# Patient Record
Sex: MALE | Race: WHITE | NOT HISPANIC OR LATINO | Employment: FULL TIME | RURAL
[De-identification: names, ages, dates, MRNs, and addresses within clinical notes are randomized per-mention and may not be internally consistent; named-entity substitution may affect disease eponyms.]

---

## 2022-02-28 ENCOUNTER — OFFICE VISIT (OUTPATIENT)
Dept: CARDIOLOGY | Facility: CLINIC | Age: 57
End: 2022-02-28

## 2022-02-28 VITALS
HEIGHT: 69 IN | SYSTOLIC BLOOD PRESSURE: 125 MMHG | WEIGHT: 168 LBS | BODY MASS INDEX: 24.88 KG/M2 | HEART RATE: 121 BPM | DIASTOLIC BLOOD PRESSURE: 82 MMHG

## 2022-02-28 DIAGNOSIS — R06.02 SHORTNESS OF BREATH: ICD-10-CM

## 2022-02-28 DIAGNOSIS — R00.2 PALPITATIONS: Primary | ICD-10-CM

## 2022-02-28 PROCEDURE — 99204 OFFICE O/P NEW MOD 45 MIN: CPT | Performed by: SPECIALIST

## 2022-02-28 PROCEDURE — 93000 ELECTROCARDIOGRAM COMPLETE: CPT | Performed by: SPECIALIST

## 2022-02-28 RX ORDER — METOPROLOL SUCCINATE 25 MG/1
25 TABLET, EXTENDED RELEASE ORAL DAILY
Qty: 90 TABLET | Refills: 3 | Status: SHIPPED | OUTPATIENT
Start: 2022-02-28 | End: 2022-06-06 | Stop reason: SDUPTHER

## 2022-02-28 RX ORDER — OMEPRAZOLE 40 MG/1
CAPSULE, DELAYED RELEASE ORAL
COMMUNITY
Start: 2022-02-08

## 2022-02-28 RX ORDER — ASPIRIN 81 MG/1
81 TABLET ORAL DAILY
COMMUNITY

## 2022-02-28 RX ORDER — CETIRIZINE HYDROCHLORIDE 10 MG/1
10 TABLET ORAL DAILY
COMMUNITY
Start: 2021-12-16

## 2022-02-28 NOTE — PROGRESS NOTES
[facility]   Cardiology Consult Note    Patient Name: Melvin Lew  : 1965  Referring Physician: No ref. provider found  Subjective   Subjective     Reason for Consult/ Chief Complaint:   Chief Complaint   Patient presents with   • Palpitations   • Shortness of Breath   • Dizziness       HPI:  Melvin Lew is a 56 y.o. male with history of shortness of breath for the last 2 years or so.  Shortness of breath is mostly on exertion relieved by rest.  He also has had palpitations on and off for the last year or so.  No syncopal or presyncopal episode.  Recent Holter showed frequent PVCs and bigeminal rhythm.  No chest pain.  He has chronic dizziness.    Review of Systems:   Constitutional no fever,  no weight loss   Skin no rash   Otolaryngeal no difficulty swallowing   Cardiovascular See HPI   Pulmonary no cough, no sputum production   Gastrointestinal no constipation, no diarrhea   Genitourinary no dysuria, no hematuria   Hematologic no easy bruisability, no abnormal bleeding   Musculoskeletal no muscle pain   Neurologic no dizziness, no falls     Personal History     Past Medical History:  Past Medical History:   Diagnosis Date   • Abnormal ECG    • Cancer (HCC)        Family History:   Family History   Problem Relation Age of Onset   • Stroke Father        Social History:  reports that he has quit smoking. He has never used smokeless tobacco. He reports that he does not drink alcohol and does not use drugs.    Home Medications:  aspirin, cetirizine, omeprazole, and sertraline    Allergies:  No Known Allergies    Objective    Objective     Vitals:   Heart Rate:  [121] 121  BP: (125)/(82) 125/82  Body mass index is 24.81 kg/m².  Physical Exam:   Constitutional: Awake, alert, No acute distress    Eyes: PERRLA, sclerae anicteric, no conjunctival injection   HENT: NCAT, mucous membranes moist   Neck: Supple, no thyromegaly, no lymphadenopathy, trachea midline   Respiratory: Clear to auscultation  bilaterally, nonlabored respirations    Cardiovascular: RRR, no murmurs or rubs. Palpable pedal pulses bilaterally   Gastrointestinal: Positive bowel sounds, soft, nontender, nondistended   Musculoskeletal: No bilateral ankle edema, no clubbing or cyanosis to extremities   Psychiatric: Appropriate affect, cooperative   Neurologic: Oriented x 3, strength symmetric in all extremities, Cranial Nerves grossly intact to confrontation, speech clear   Skin: No rashes     Result Review    Result Review:  I have personally reviewed the available results:  [x]  Laboratory  [x]  EKG/Telemetry   [x]  Cardiology/Vascular   [x] Medications  [x]  Old records             ECG 12 Lead    Date/Time: 2/28/2022 12:01 PM  Performed by: Hay Mixon MD  Authorized by: Hay Mixon MD   Comparison: not compared with previous ECG   Previous ECG: no previous ECG available  Rhythm: sinus rhythm  Rate: normal  QRS axis: normal    Clinical impression: normal ECG  Comments: Normal sinus rhythm.  No significant acute changes noted.             Impression/Plan  1.  Palpitations/PVCs: 24-hour Holter reviewed shows frequent PVCs and bigeminal rhythm.  Start Toprol-XL 25 mg a day.  Low caffeine diet advised.  Echocardiogram to evaluate left ventricular systolic function.  Treadmill stress test to evaluate for ischemia.  Check magnesium levels.        Electronically signed by Hay Mixon MD, 02/28/22, 11:30 AM EST.

## 2022-03-11 ENCOUNTER — TELEPHONE (OUTPATIENT)
Dept: CARDIOLOGY | Facility: CLINIC | Age: 57
End: 2022-03-11

## 2022-03-11 NOTE — TELEPHONE ENCOUNTER
----- Message from Hay Mixon MD sent at 3/7/2022 12:21 PM EST -----  Notify pt echocardiogram shows normal heart function and no significant valve abnormality. Keep follow up as scheduled.

## 2022-03-25 ENCOUNTER — TELEPHONE (OUTPATIENT)
Dept: CARDIOLOGY | Facility: CLINIC | Age: 57
End: 2022-03-25

## 2022-03-25 NOTE — TELEPHONE ENCOUNTER
----- Message from Hay Mixon MD sent at 3/21/2022  2:27 PM EDT -----  Notify pt stress test is negative. Keep follow up as scheduled.

## 2022-06-05 NOTE — PROGRESS NOTES
[facility]  Cardiology progress Note    Patient Name: Melvin Lew  : 1965    CHIEF COMPLAINT  Palpitations      Subjective   Subjective     HISTORY OF PRESENT ILLNESS    Melvin Lew is a 56 y.o. male with history of palpitations and PVCs.  No palpitations.  No chest pain.    Review of Systems:   Constitutional no fever,  no weight loss   Skin no rash   Otolaryngeal no difficulty swallowing   Cardiovascular See HPI   Pulmonary no cough, no sputum production   Gastrointestinal no constipation, no diarrhea   Genitourinary no dysuria, no hematuria   Hematologic no easy bruisability, no abnormal bleeding   Musculoskeletal no muscle pain   Neurologic no dizziness, no falls         Personal History     Social History:  reports that he has quit smoking. He has never used smokeless tobacco. He reports that he does not drink alcohol and does not use drugs.    Home Medications:  Current Outpatient Medications on File Prior to Visit   Medication Sig   • aspirin 81 MG EC tablet Take 81 mg by mouth Daily.   • cetirizine (zyrTEC) 10 MG tablet Take 10 mg by mouth Daily. FOR 30 DAYS   • omeprazole (priLOSEC) 40 MG capsule TAKE 1 CAPSULE BY MOUTH ONCE DAILY 30 MINUTES BEFORE MORNING MEAL   • sertraline (ZOLOFT) 50 MG tablet Take 50 mg by mouth Daily.   • [DISCONTINUED] metoprolol succinate XL (Toprol XL) 25 MG 24 hr tablet Take 1 tablet by mouth Daily.     No current facility-administered medications on file prior to visit.     Allergies:  No Known Allergies    Objective    Objective       Vitals:   Heart Rate:  [66] 66  BP: (122)/(86) 122/86  Body mass index is 24.66 kg/m².     Physical Exam:   Constitutional: Awake, alert, No acute distress    Eyes: PERRLA, sclerae anicteric, no conjunctival injection   HENT: NCAT, mucous membranes moist   Neck: Supple, no thyromegaly, no lymphadenopathy, trachea midline   Respiratory: Clear to auscultation bilaterally, nonlabored respirations    Cardiovascular: RRR,  no murmurs or rubs. Palpable pedal pulses bilaterally   Musculoskeletal: No bilateral ankle edema, no cyanosis to extremities   Psychiatric: Appropriate affect, cooperative   Neurologic: Oriented x 3, strength symmetric in all extremities, Cranial Nerves grossly intact to confrontation, speech clear   Skin: No rashes.    Result Review    Result Review:  I have personally reviewed the available results from  [x]  Laboratory  [x]  EKG  [x]  Cardiology  [x]  Medications  [x]  Old records  []  Other:   Procedures  Results for orders placed in visit on 03/07/22    Adult Transthoracic Echo Complete W/ Cont if Necessary Per Protocol    Interpretation Summary  Normal left ventricular systolic function.  No significant valve abnormalities noted.     Impression/Plan:  1.  Palpitations/PVCs: Continue Toprol-XL 25 mg a day.  Echocardiogram showed normal systolic function.  Treadmill stress test was negative for ischemia.           Hay Mixon MD   06/06/22   10:56 EDT

## 2022-06-06 ENCOUNTER — OFFICE VISIT (OUTPATIENT)
Dept: CARDIOLOGY | Facility: CLINIC | Age: 57
End: 2022-06-06

## 2022-06-06 VITALS
HEIGHT: 69 IN | SYSTOLIC BLOOD PRESSURE: 122 MMHG | WEIGHT: 167 LBS | HEART RATE: 66 BPM | BODY MASS INDEX: 24.73 KG/M2 | DIASTOLIC BLOOD PRESSURE: 86 MMHG

## 2022-06-06 DIAGNOSIS — R00.2 PALPITATIONS: Primary | ICD-10-CM

## 2022-06-06 PROCEDURE — 99213 OFFICE O/P EST LOW 20 MIN: CPT | Performed by: SPECIALIST

## 2022-06-06 RX ORDER — METOPROLOL SUCCINATE 25 MG/1
25 TABLET, EXTENDED RELEASE ORAL DAILY
Qty: 90 TABLET | Refills: 3 | Status: SHIPPED | OUTPATIENT
Start: 2022-06-06

## 2022-06-06 RX ORDER — METOPROLOL SUCCINATE 25 MG/1
25 TABLET, EXTENDED RELEASE ORAL DAILY
Qty: 90 TABLET | Refills: 3 | Status: SHIPPED | OUTPATIENT
Start: 2022-06-06 | End: 2022-06-06 | Stop reason: SDUPTHER

## 2023-01-03 ENCOUNTER — OFFICE VISIT (OUTPATIENT)
Dept: CARDIOLOGY | Facility: CLINIC | Age: 58
End: 2023-01-03
Payer: COMMERCIAL

## 2023-01-03 VITALS
BODY MASS INDEX: 24.29 KG/M2 | HEIGHT: 69 IN | WEIGHT: 164 LBS | HEART RATE: 57 BPM | DIASTOLIC BLOOD PRESSURE: 72 MMHG | SYSTOLIC BLOOD PRESSURE: 112 MMHG

## 2023-01-03 DIAGNOSIS — R00.2 PALPITATIONS: Primary | ICD-10-CM

## 2023-01-03 PROCEDURE — 99213 OFFICE O/P EST LOW 20 MIN: CPT | Performed by: SPECIALIST

## 2023-01-03 NOTE — PROGRESS NOTES
[facility]  Cardiology progress Note    Patient Name: Melvin Lew  : 1965    CHIEF COMPLAINT  Palpitations        Subjective   Subjective     HISTORY OF PRESENT ILLNESS    Melvin Lew is a 57 y.o. male with history of palpitations.  No further palpitations.  He complains of some fatigue off and on for the last few months.    REVIEW OF SYSTEMS    Constitutional:    No fever, no weight loss  Skin:     No rash  Otolaryngeal:    No difficulty swallowing  Cardiovascular: See HPI.  Pulmonary:    No cough, no sputum production    Personal History     Social History:    reports that he has quit smoking. He has never used smokeless tobacco. He reports that he does not drink alcohol and does not use drugs.    Home Medications:  Current Outpatient Medications on File Prior to Visit   Medication Sig   • metoprolol succinate XL (Toprol XL) 25 MG 24 hr tablet Take 1 tablet by mouth Daily.   • omeprazole (priLOSEC) 40 MG capsule TAKE 1 CAPSULE BY MOUTH ONCE DAILY 30 MINUTES BEFORE MORNING MEAL   • sertraline (ZOLOFT) 50 MG tablet Take 50 mg by mouth Daily.   • aspirin 81 MG EC tablet Take 81 mg by mouth Daily.   • cetirizine (zyrTEC) 10 MG tablet Take 10 mg by mouth Daily. FOR 30 DAYS     No current facility-administered medications on file prior to visit.       Past Medical History:   Diagnosis Date   • Abnormal ECG    • Cancer (HCC)        Allergies:  No Known Allergies    Objective    Objective       Vitals:   Heart Rate:  [57] 57  BP: (112)/(72) 112/72  Body mass index is 24.22 kg/m².     PHYSICAL EXAM:    General Appearance:   · well developed  · well nourished  HENT:   · oropharynx moist  · lips not cyanotic  Neck:  · thyroid not enlarged  · supple  Respiratory:  · no respiratory distress  · normal breath sounds  · no rales  Cardiovascular:  · no jugular venous distention  · regular rhythm  · apical impulse normal  · S1 normal, S2 normal  · no S3, no S4   · no murmur  · no rub, no  thrill  · carotid pulses normal; no bruit  · pedal pulses normal  · lower extremity edema: none    Skin:   · warm, dry  Psychiatric:  · judgement and insight appropriate  · normal mood and affect        Result Review:  I have personally reviewed the available results from  [x]  Laboratory  [x]  EKG  [x]  Cardiology  [x]  Medications  [x]  Old records  []  Other:     Procedures  Results for orders placed in visit on 03/07/22    Adult Transthoracic Echo Complete W/ Cont if Necessary Per Protocol    Interpretation Summary  Normal left ventricular systolic function.  No significant valve abnormalities noted.     Impression/Plan:  1.  Palpitations stable: Continue Toprol-XL 25 mg a day.  Echocardiogram within normal limits.  2.  Fatigue: Follow-up with his PMD.           Hay Mixon MD   01/03/23   13:58 EST

## 2023-04-18 ENCOUNTER — OFFICE VISIT (OUTPATIENT)
Dept: SLEEP MEDICINE | Facility: HOSPITAL | Age: 58
End: 2023-04-18
Payer: COMMERCIAL

## 2023-04-18 VITALS
WEIGHT: 168.4 LBS | OXYGEN SATURATION: 97 % | HEIGHT: 69 IN | HEART RATE: 70 BPM | DIASTOLIC BLOOD PRESSURE: 62 MMHG | BODY MASS INDEX: 24.94 KG/M2 | SYSTOLIC BLOOD PRESSURE: 119 MMHG

## 2023-04-18 DIAGNOSIS — G47.30 HYPERSOMNIA WITH SLEEP APNEA: ICD-10-CM

## 2023-04-18 DIAGNOSIS — R00.2 PALPITATIONS: ICD-10-CM

## 2023-04-18 DIAGNOSIS — G47.10 HYPERSOMNIA WITH SLEEP APNEA: ICD-10-CM

## 2023-04-18 DIAGNOSIS — G47.33 OSA (OBSTRUCTIVE SLEEP APNEA): Primary | ICD-10-CM

## 2023-04-18 DIAGNOSIS — R53.83 OTHER FATIGUE: ICD-10-CM

## 2023-04-18 RX ORDER — KETOCONAZOLE 20 MG/ML
SHAMPOO TOPICAL
COMMUNITY
Start: 2023-04-13

## 2023-04-18 RX ORDER — AZELASTINE 1 MG/ML
SPRAY, METERED NASAL EVERY 24 HOURS
COMMUNITY

## 2023-04-18 RX ORDER — SERTRALINE HYDROCHLORIDE 100 MG/1
TABLET, FILM COATED ORAL
COMMUNITY
Start: 2023-01-25

## 2023-04-18 RX ORDER — CYANOCOBALAMIN 1000 UG/ML
INJECTION, SOLUTION INTRAMUSCULAR; SUBCUTANEOUS
COMMUNITY
Start: 2023-04-10

## 2023-04-18 NOTE — PROGRESS NOTES
Sleep Consultation    Patient Name: Melvin Lew  Age/Sex: 57 y.o. male  : 1965  MRN: [id number]    Date of Encounter Visit: 2023  Encounter Provider: Kristine Hannon MD  Referring Provider: No Known Provider  Place of Service: [facility]  Patient Care Team:  Pauline Gregg APRN as PCP - General (Family Medicine)    Subjective:     Reason for Consult: Daytime fatigue and sleepiness    History of Present Illness:  Melvin Lew is a 57 y.o. male is here for evaluation of KEVIN due to fatigue and sleepiness  Patient had a prior work-up for obstructive sleep apnea given the above and it was positive for mild case of sleep apnea.  The results of the sleep study were available for my independent review, the patient had overall AHI of 15.4  Patient was started on auto CPAP however patient denies any significant subjective improvement with the CPAP therapy.  He had a compliance download that showed adequate adherence  He may have gained 10 pounds si  over the last 5 years but based on the recorded weight on the time of the sleep study and today's visit devoid is almost the same    Patient complains of daytime fatigue and sleepiness with an Ewing Sleepiness Scale (ESS) of 6.  Patient complains of daytime fatigue and nonrefreshing sleep despite adequate time in bed  Denies any symptoms of restless leg syndrome.  He does have some leg jerking at night but no reported.  Movement on his sleep study  Patient denies any cataplexy, sleep paralysis or other symptoms to suggest narcolepsy.  Patient denies any parasomnias.  Denies any history of seizure disorder or recent head trauma.  Patient spends adequate amount of time in bed with no evidence of sleep restriction or improper sleep hygiene. Bedtime is around 4 AM, wake up time around 11 with delayed sleep phase but he gets 7 hours of sleep in between withsleep onset within 5 minutes and he does wake up feeling okay initially  "     Comorbidities include: Acid reflux, vocal cord cancer status post resection    Review of Systems:   A twelve-system review was conducted and was negative except for the following: Hoarseness, nasal drainage, irregular heartbeat, heartburn and dysphagia, rash and easy bruising.        Past Medical History:  Past Medical History:   Diagnosis Date   • Abnormal ECG    • Acid reflux    • Cancer        History reviewed. No pertinent surgical history.    Home Medications:     Current Outpatient Medications:   •  azelastine (ASTELIN) 0.1 % nasal spray, Daily., Disp: , Rfl:   •  cetirizine (zyrTEC) 10 MG tablet, Take 1 tablet by mouth Daily. FOR 30 DAYS, Disp: , Rfl:   •  cyanocobalamin 1000 MCG/ML injection, INJECT 1 ML (CC) INTRAMUSCULARLY, Disp: , Rfl:   •  ketoconazole (NIZORAL) 2 % shampoo, , Disp: , Rfl:   •  metoprolol succinate XL (Toprol XL) 25 MG 24 hr tablet, Take 1 tablet by mouth Daily., Disp: 90 tablet, Rfl: 3  •  omeprazole (priLOSEC) 40 MG capsule, TAKE 1 CAPSULE BY MOUTH ONCE DAILY 30 MINUTES BEFORE MORNING MEAL, Disp: , Rfl:   •  sertraline (ZOLOFT) 100 MG tablet, , Disp: , Rfl:   •  aspirin 81 MG EC tablet, Take 1 tablet by mouth Daily., Disp: , Rfl:     Allergies:  No Known Allergies    Past Social History:  Social History     Socioeconomic History   • Marital status:    Tobacco Use   • Smoking status: Former     Packs/day: 1.00     Types: Cigarettes   • Smokeless tobacco: Never   Vaping Use   • Vaping Use: Never used   Substance and Sexual Activity   • Alcohol use: Never   • Drug use: Yes   • Sexual activity: Defer       Past Family History:  Family History   Problem Relation Age of Onset   • Stroke Father    • Sleep walking Brother    • Sleep walking Niece         Objective:        Vital Signs:   Visit Vitals  /62   Pulse 70   Ht 175.3 cm (69\")   Wt 76.4 kg (168 lb 6.4 oz)   SpO2 97%   BMI 24.87 kg/m²     Wt Readings from Last 3 Encounters:   04/18/23 76.4 kg (168 lb 6.4 oz)   01/03/23 " 74.4 kg (164 lb)   06/06/22 75.8 kg (167 lb)     Neck Circumference: 13.5 inches    Physical Exam:   GEN:  No acute distress, alert, cooperative, well developed   EYES:   Sclerae clear. No icterus. PERRL. Normal EOM  ENT:   External ears/nose normal, no oral lesions, no thrush, mucous membranes moist, Septum midline. Mallampati II airway.  Slightly swollen uvula  NECK:  Supple, midline trachea, no JVD  LUNGS: Normal chest on inspection, CTAB, no wheezes. No rhonchi. No crackles. Respirations regular, even and unlabored.   CV:  Regular rhythm and rate. Normal S1/S2. No murmurs, gallops, or rubs noted.  ABD:  Soft, nontender and nondistended. Normal bowel sounds. No guarding  EXT:  Moves all extremities well. No cyanosis. No redness. No edema.   Skin: Dry, intact, no bleeding      Diagnostic Data:  Polysomnography 12/20/2022: AHI 5.4 with no hypoxemia, no periodic leg movement  Compliance download auto CPAP 5-20 with a median pressure of 5.7 95th percentile of 7.7 with 94% adherence, 6 hours average nightly use with residual AHI of 2.9 and with minimal air leak with a median leak of 0.1 L/min    Assessment and Plan:       ICD-10-CM ICD-9-CM   1. KEVIN (obstructive sleep apnea)  G47.33 327.23   2. Hypersomnia with sleep apnea  G47.10 780.53    G47.30    3. Other fatigue  R53.83 780.79   4. Palpitations  R00.2 785.1       Recommendations:     This gentleman has a very mild case of obstructive sleep apnea without any significant hypoxemia, the sleep study did not show any other abnormality  The patient is on CPAP trial however he does not feel any better on the treatment.  I did review with the patient results of the sleep study, the clinical benefit from the CPAP as far as reduce risk for cardiovascular disease is  negligible and only indication for the CPAP is to improve on the quality of sleep and the daytime symptoms and patient does not seem to be having any improvement despite adequate adherence to the  machine  Before discontinuing the therapy we will empirically increase CPAP pressure for few weeks and follow-up on the results, if patient continues with the same adherence and still feeling no better, we can discontinue the CPAP given the lack of benefit and the lack of clinical indication and address his symptoms separately.    Discussed with the patient in details, he is agreeable with the above plan    Orders Placed This Encounter   Procedures   • PAP Therapy     No orders of the defined types were placed in this encounter.     Return in about 4 weeks (around 5/16/2023).    Kristine Hannon MD   [facility]   04/18/23  17:16 EDT    Dictated utilizing Dragon dictation

## 2023-06-14 RX ORDER — METOPROLOL SUCCINATE 25 MG/1
TABLET, EXTENDED RELEASE ORAL
Qty: 90 TABLET | Refills: 0 | Status: SHIPPED | OUTPATIENT
Start: 2023-06-14

## 2023-07-28 ENCOUNTER — TELEPHONE (OUTPATIENT)
Dept: CARDIOLOGY | Facility: CLINIC | Age: 58
End: 2023-07-28
Payer: COMMERCIAL

## 2023-07-28 NOTE — TELEPHONE ENCOUNTER
The dexamethasone is a steroid medication which will help with the inflammatory symptoms that are resulting from COVID.  Although it may increase his blood pressure transiently, would still recommend for him to take for treatment.  Please send for records from [facility], his syncopal episode could be related to his COVID infection, however if he is persistently been having dizziness for several months, we will look to see what other testing and/or lab work they have done.  He is already scheduled for an appointment with Dr. Mixon in approximately 3 weeks.

## 2023-07-28 NOTE — TELEPHONE ENCOUNTER
Caller: SILVERIO WOODSON     Relationship: WIFE    Best call back number: [phone]    What is your medical concern? PATIENT WAS IN [facility] YESTERDAY FROM PASSING OUT ON [place].HE HAS BEEN EXPERIENCING DIZZINESS FOR MONTHS. THEY ONLY DIAGNOSED HIM WITH COVID BUT THEY ARE CONCERNED BECAUSE HIS BLOOD PRESSURE HAS BEEN UP. IN THE ER IT /84. TODAY WHILE WE WERE ON THE PHONE IT /78. THEY ARE WANTING TO SPEAK TO SOMEONE CLINICAL ABOUT WHAT'S GOING ON.   THEY ALSO THINK  PASSING OUT MIGHT RELATED TO SOME VERTIGO MEDICATION HE GOT THIS PAST WEEK FROM [facility] [place].  [facility] PRESCRIBED 4 OR 5 STEROID PILLS AND THEY'RE NOT SURE IF SHE SHOULD TAKE IT OR NOT WITH HIGH BLOOD PRESSURE. IT'S CALLED DEXAMETHASOM 4MG  PLEASE CONTACT TODAY SO THEY CAN KNOW IF HE CAN TAKE MEDICATION    How long has this issue been going on? A FEW MONTHS FOR DIZZINESS AND SINCE YESTERDAY FOR PASSING OUT    Is your provider already aware of this issue? NO, PATIENT DOES NOT NORMALLY HAS BLOOD PRESSURE ISSUES     Have you been treated for this issue? WAS IN ER BUT WASN'T REALLY TREATED FOR IT

## 2023-07-28 NOTE — TELEPHONE ENCOUNTER
DENNIS patient. Patient verbalized understanding. Encouraged us to get his records from PCP- Pauline Gregg at [facility] as well as [facility]. Patient verbalized understanding and appreciation.

## 2023-08-16 NOTE — PROGRESS NOTES
[facility]  Cardiology progress Note    Patient Name: Melvin Lew    CHIEF COMPLAINT  Palpitations        Subjective   Subjective     HISTORY OF PRESENT ILLNESS    Melvin Lew is a 57 y.o. male with history of palpitations.  No further palpitations.    REVIEW OF SYSTEMS    Constitutional:    No fever, no weight loss  Skin:     No rash  Otolaryngeal:    No difficulty swallowing  Cardiovascular: See HPI.  Pulmonary:    No cough, no sputum production    Personal History     Social History:    reports that he quit smoking about 33 years ago. His smoking use included cigarettes. He started smoking about 43 years ago. He has a 10.00 pack-year smoking history. He has never used smokeless tobacco. He reports that he does not currently use alcohol after a past usage of about 20.0 standard drinks per week. He reports current drug use.    Home Medications:  Current Outpatient Medications on File Prior to Visit   Medication Sig    albuterol sulfate  (90 Base) MCG/ACT inhaler     azelastine (ASTELIN) 0.1 % nasal spray Daily. As needed    cetirizine (zyrTEC) 10 MG tablet Take 1 tablet by mouth Daily. FOR 30 DAYS    cholecalciferol (VITAMIN D3) 10 MCG (400 UNIT) tablet     dexAMETHasone (DECADRON) 4 MG tablet Daily.    fexofenadine (ALLEGRA) 180 MG tablet Daily.    ketoconazole (NIZORAL) 2 % shampoo     meclizine (ANTIVERT) 25 MG tablet As needed    meloxicam (MOBIC) 15 MG tablet Take 1 tablet by mouth As Needed.    methocarbamol (ROBAXIN) 750 MG tablet As Needed.    metoprolol succinate XL (TOPROL-XL) 25 MG 24 hr tablet Take 1 tablet by mouth once daily    omeprazole (priLOSEC) 40 MG capsule TAKE 1 CAPSULE BY MOUTH ONCE DAILY 30 MINUTES BEFORE MORNING MEAL    tamsulosin (FLOMAX) 0.4 MG capsule 24 hr capsule Take 1 capsule by mouth Daily.    doxycycline (VIBRAMYCIN) 100 MG capsule Take 1 capsule by mouth Every 12 (Twelve) Hours. (Patient not taking: Reported on 8/18/2023)    [DISCONTINUED]  amoxicillin-clavulanate (AUGMENTIN) 875-125 MG per tablet  (Patient not taking: Reported on 8/18/2023)    [DISCONTINUED] aspirin 81 MG EC tablet Take 1 tablet by mouth Daily. (Patient not taking: Reported on 8/18/2023)    [DISCONTINUED] azithromycin (ZITHROMAX) 250 MG tablet See Admin Instructions. (Patient not taking: Reported on 8/18/2023)    [DISCONTINUED] cyanocobalamin 1000 MCG/ML injection INJECT 1 ML (CC) INTRAMUSCULARLY (Patient not taking: Reported on 8/18/2023)    [DISCONTINUED] levoFLOXacin (LEVAQUIN) 500 MG tablet Take 1 tablet by mouth Daily. (Patient not taking: Reported on 8/18/2023)    [DISCONTINUED] metoprolol succinate XL (TOPROL-XL) 50 MG 24 hr tablet Daily. (Patient not taking: Reported on 8/18/2023)    [DISCONTINUED] predniSONE (DELTASONE) 10 MG tablet Take 1 tablet by mouth Daily. (Patient not taking: Reported on 8/18/2023)    [DISCONTINUED] sertraline (ZOLOFT) 100 MG tablet  (Patient not taking: Reported on 8/18/2023)    [DISCONTINUED] sertraline (ZOLOFT) 50 MG tablet Daily. (Patient not taking: Reported on 8/18/2023)     No current facility-administered medications on file prior to visit.       Past Medical History:   Diagnosis Date    Abnormal ECG     Acid reflux     Cancer        Allergies:  No Known Allergies    Objective    Objective       Vitals:   Heart Rate:  [83] 83  BP: (125)/(94) 125/94  Body mass index is 24.56 kg/mý.     PHYSICAL EXAM:    General Appearance:   well developed  well nourished  HENT:   oropharynx moist  lips not cyanotic  Neck:  thyroid not enlarged  supple  Respiratory:  no respiratory distress  normal breath sounds  no rales  Cardiovascular:  no jugular venous distention  regular rhythm  apical impulse normal  S1 normal, S2 normal  no S3, no S4   no murmur  no rub, no thrill  carotid pulses normal; no bruit  pedal pulses normal  lower extremity edema: none    Skin:   warm, dry  Psychiatric:  judgement and insight appropriate  normal mood and affect        Result  Review:  I have personally reviewed the available results from  [x]  Laboratory  [x]  EKG  [x]  Cardiology  [x]  Medications  [x]  Old records  []  Other:     Procedures    Results for orders placed in visit on 03/07/22    Adult Transthoracic Echo Complete W/ Cont if Necessary Per Protocol    Interpretation Summary  Normal left ventricular systolic function.  No significant valve abnormalities noted.     Impression/Plan:  1.  Stable palpitations: Continue Toprol-XL 25 mg once a day.  Echocardiogram within normal limits.             Hay Mixon MD   08/18/23   11:57 EDT

## 2023-08-18 ENCOUNTER — OFFICE VISIT (OUTPATIENT)
Dept: CARDIOLOGY | Facility: CLINIC | Age: 58
End: 2023-08-18
Payer: COMMERCIAL

## 2023-08-18 VITALS
DIASTOLIC BLOOD PRESSURE: 94 MMHG | HEART RATE: 83 BPM | HEIGHT: 69 IN | WEIGHT: 166.4 LBS | BODY MASS INDEX: 24.65 KG/M2 | SYSTOLIC BLOOD PRESSURE: 125 MMHG

## 2023-08-18 DIAGNOSIS — R00.2 PALPITATIONS: Primary | ICD-10-CM

## 2023-08-18 PROCEDURE — 99213 OFFICE O/P EST LOW 20 MIN: CPT | Performed by: SPECIALIST

## 2023-08-18 RX ORDER — METOPROLOL SUCCINATE 50 MG/1
TABLET, EXTENDED RELEASE ORAL DAILY
COMMUNITY
End: 2023-08-18

## 2023-08-18 RX ORDER — PREDNISONE 10 MG/1
1 TABLET ORAL DAILY
COMMUNITY
Start: 2023-07-18 | End: 2023-08-18

## 2023-08-18 RX ORDER — TAMSULOSIN HYDROCHLORIDE 0.4 MG/1
1 CAPSULE ORAL DAILY
COMMUNITY
Start: 2023-07-18

## 2023-08-18 RX ORDER — OMEGA-3S/DHA/EPA/FISH OIL/D3 300MG-1000
CAPSULE ORAL
COMMUNITY

## 2023-08-18 RX ORDER — MECLIZINE HYDROCHLORIDE 25 MG/1
TABLET ORAL
COMMUNITY
Start: 2023-07-18

## 2023-08-18 RX ORDER — AZITHROMYCIN 250 MG/1
TABLET, FILM COATED ORAL SEE ADMIN INSTRUCTIONS
COMMUNITY
Start: 2023-07-27 | End: 2023-08-18

## 2023-08-18 RX ORDER — ALBUTEROL SULFATE 90 UG/1
AEROSOL, METERED RESPIRATORY (INHALATION)
COMMUNITY
Start: 2023-07-28

## 2023-08-18 RX ORDER — LEVOFLOXACIN 500 MG/1
1 TABLET, FILM COATED ORAL DAILY
COMMUNITY
Start: 2023-08-10 | End: 2023-08-18

## 2023-08-18 RX ORDER — DEXAMETHASONE 4 MG/1
TABLET ORAL DAILY
COMMUNITY
Start: 2023-07-27

## 2023-08-18 RX ORDER — AMOXICILLIN AND CLAVULANATE POTASSIUM 875; 125 MG/1; MG/1
TABLET, FILM COATED ORAL
COMMUNITY
Start: 2023-08-03 | End: 2023-08-18

## 2023-08-18 RX ORDER — METHOCARBAMOL 750 MG/1
TABLET, FILM COATED ORAL AS NEEDED
COMMUNITY
Start: 2023-05-11

## 2023-08-18 RX ORDER — MELOXICAM 15 MG/1
1 TABLET ORAL AS NEEDED
COMMUNITY
Start: 2023-05-09

## 2023-08-18 RX ORDER — DOXYCYCLINE HYCLATE 100 MG/1
1 CAPSULE ORAL EVERY 12 HOURS SCHEDULED
COMMUNITY
Start: 2023-05-26

## 2023-08-18 RX ORDER — FEXOFENADINE HCL 180 MG/1
TABLET ORAL DAILY
COMMUNITY

## 2023-09-06 RX ORDER — METOPROLOL SUCCINATE 25 MG/1
TABLET, EXTENDED RELEASE ORAL
Qty: 90 TABLET | Refills: 0 | Status: SHIPPED | OUTPATIENT
Start: 2023-09-06

## 2023-12-15 RX ORDER — METOPROLOL SUCCINATE 25 MG/1
TABLET, EXTENDED RELEASE ORAL
Qty: 90 TABLET | Refills: 2 | Status: SHIPPED | OUTPATIENT
Start: 2023-12-15

## 2024-08-17 NOTE — PROGRESS NOTES
[facility]  Cardiology progress Note    Patient Name: Melvin Lew  : 1965    CHIEF COMPLAINT  Palpitations        Subjective   Subjective     HISTORY OF PRESENT ILLNESS    Melvin Lew is a 58 y.o. male with history of palpitations.  No further palpitations    REVIEW OF SYSTEMS    Constitutional:    No fever, no weight loss  Skin:     No rash  Otolaryngeal:    No difficulty swallowing  Cardiovascular: See HPI.  Pulmonary:    No cough, no sputum production    Personal History     Social History:    reports that he quit smoking about 34 years ago. His smoking use included cigarettes. He started smoking about 44 years ago. He has a 10 pack-year smoking history. He has never used smokeless tobacco. He reports that he does not currently use alcohol after a past usage of about 20.0 standard drinks of alcohol per week. He reports current drug use.    Home Medications:  Current Outpatient Medications on File Prior to Visit   Medication Sig    albuterol sulfate  (90 Base) MCG/ACT inhaler     azelastine (ASTELIN) 0.1 % nasal spray Daily. As needed    cetirizine (zyrTEC) 10 MG tablet Take 1 tablet by mouth Daily. FOR 30 DAYS    cholecalciferol (VITAMIN D3) 10 MCG (400 UNIT) tablet     dexAMETHasone (DECADRON) 4 MG tablet Daily.    doxycycline (VIBRAMYCIN) 100 MG capsule Take 1 capsule by mouth Every 12 (Twelve) Hours.    fexofenadine (ALLEGRA) 180 MG tablet Daily.    ketoconazole (NIZORAL) 2 % shampoo     meclizine (ANTIVERT) 25 MG tablet As needed    meloxicam (MOBIC) 15 MG tablet Take 1 tablet by mouth As Needed.    methocarbamol (ROBAXIN) 750 MG tablet As Needed.    omeprazole (priLOSEC) 40 MG capsule TAKE 1 CAPSULE BY MOUTH ONCE DAILY 30 MINUTES BEFORE MORNING MEAL    tamsulosin (FLOMAX) 0.4 MG capsule 24 hr capsule Take 1 capsule by mouth Daily.    [DISCONTINUED] metoprolol succinate XL (TOPROL-XL) 25 MG 24 hr tablet Take 1 tablet by mouth once daily     No current facility-administered  medications on file prior to visit.       Past Medical History:   Diagnosis Date    Abnormal ECG     Acid reflux     Cancer        Allergies:  No Known Allergies    Objective    Objective       Vitals:   Heart Rate:  [76] 76  BP: (120)/(78) 120/78  Body mass index is 23.92 kg/m².     PHYSICAL EXAM:    General Appearance:   well developed  well nourished  HENT:   oropharynx moist  lips not cyanotic  Neck:  thyroid not enlarged  supple  Respiratory:  no respiratory distress  normal breath sounds  no rales  Cardiovascular:  no jugular venous distention  regular rhythm  apical impulse normal  S1 normal, S2 normal  no S3, no S4   no murmur  no rub, no thrill  carotid pulses normal; no bruit  pedal pulses normal  lower extremity edema: none    Skin:   warm, dry  Psychiatric:  judgement and insight appropriate  normal mood and affect        Result Review:  I have personally reviewed the available results from  [x]  Laboratory  [x]  EKG  [x]  Cardiology  [x]  Medications  [x]  Old records  []  Other:     Procedures    Results for orders placed in visit on 03/07/22    Adult Transthoracic Echo Complete W/ Cont if Necessary Per Protocol    Interpretation Summary  Normal left ventricular systolic function.  No significant valve abnormalities noted.     Impression/Plan:  1.  Stable palpitations: Continue Toprol-XL 25 mg once a day.  No palpitations.           Hay Mixon MD   08/20/24   14:37 EDT

## 2024-08-20 ENCOUNTER — OFFICE VISIT (OUTPATIENT)
Dept: CARDIOLOGY | Facility: CLINIC | Age: 59
End: 2024-08-20
Payer: COMMERCIAL

## 2024-08-20 VITALS
SYSTOLIC BLOOD PRESSURE: 120 MMHG | WEIGHT: 162 LBS | HEIGHT: 69 IN | HEART RATE: 76 BPM | BODY MASS INDEX: 23.99 KG/M2 | DIASTOLIC BLOOD PRESSURE: 78 MMHG

## 2024-08-20 DIAGNOSIS — R00.2 PALPITATIONS: Primary | ICD-10-CM

## 2024-08-20 PROCEDURE — 99213 OFFICE O/P EST LOW 20 MIN: CPT | Performed by: SPECIALIST

## 2024-08-20 RX ORDER — METOPROLOL SUCCINATE 25 MG/1
25 TABLET, EXTENDED RELEASE ORAL DAILY
Qty: 90 TABLET | Refills: 2 | Status: SHIPPED | OUTPATIENT
Start: 2024-08-20

## 2025-02-24 ENCOUNTER — TELEPHONE (OUTPATIENT)
Dept: CARDIOLOGY | Facility: CLINIC | Age: 60
End: 2025-02-24
Payer: COMMERCIAL

## 2025-02-24 DIAGNOSIS — F98.8 ATTENTION DEFICIT DISORDER, UNSPECIFIED TYPE: ICD-10-CM

## 2025-02-24 DIAGNOSIS — R00.2 PALPITATIONS: Primary | ICD-10-CM

## 2025-02-24 NOTE — TELEPHONE ENCOUNTER
Caller: SILVERIO WOODSON     Relationship: [facility]    Best call back number: [phone]     What is the medical concern/diagnosis: ADD    What specialty or service is being requested: BEHAVIORAL HEALTH    What is the provider, practice or medical service name: [facility] - HAS TO BE A [employer] HEALTH PROVIDER TO REFER THE PATIENT TO THIS CLINIC    What is the office location:     What is the office phone number: [phone]    Any additional details: PROVIDER NEEDS TO REFERRAL PATIENT FOR TESTING. PLEASE CALL PATIENT WITH ANY QUESTIONS. THANK YOU!        PATIENT ALSO STATES THAT METOPROLOL IS NOT WORKING WITH TIREDNESS. PATIENT WANTED TO LET THE PROVIDER KNOW. PLEASE CALL PATIENT TO ADVISE. THANK YOU!

## 2025-02-24 NOTE — TELEPHONE ENCOUNTER
Agree with above recommendations regarding switching metoprolol to evening  Refer to psychiatry Dr Shepard

## 2025-02-24 NOTE — TELEPHONE ENCOUNTER
[facility] patient. Patient reports fatigue while taking metoprolol 25 mg daily. Reports palpitations and blood pressure have been well controlled. Advised to take metoprolol in the evening and report back if no improvement in symptoms. Advised to monitor blood pressure and heart rate and report if any problem.    Patient requesting referral for Behavioral Health. States must be referred by [facility] provider. PCP is not in [facility] network. Please advise.